# Patient Record
Sex: MALE | NOT HISPANIC OR LATINO | Employment: FULL TIME | URBAN - METROPOLITAN AREA
[De-identification: names, ages, dates, MRNs, and addresses within clinical notes are randomized per-mention and may not be internally consistent; named-entity substitution may affect disease eponyms.]

---

## 2019-06-25 ENCOUNTER — HOSPITAL ENCOUNTER (EMERGENCY)
Facility: CLINIC | Age: 44
Discharge: HOME | End: 2019-06-25
Attending: FAMILY MEDICINE | Admitting: FAMILY MEDICINE
Payer: COMMERCIAL

## 2019-06-25 VITALS
DIASTOLIC BLOOD PRESSURE: 89 MMHG | OXYGEN SATURATION: 98 % | SYSTOLIC BLOOD PRESSURE: 121 MMHG | TEMPERATURE: 98 F | RESPIRATION RATE: 16 BRPM | WEIGHT: 135 LBS | HEART RATE: 69 BPM

## 2019-06-25 DIAGNOSIS — F10.920 ALCOHOLIC INTOXICATION WITHOUT COMPLICATION (H): ICD-10-CM

## 2019-06-25 DIAGNOSIS — F10.129 ALCOHOL ABUSE WITH INTOXICATION (H): ICD-10-CM

## 2019-06-25 LAB
ALCOHOL BREATH TEST: 0.18 (ref 0–0.01)
ALCOHOL BREATH TEST: 0.23 (ref 0–0.01)
AMPHETAMINES UR QL SCN: NEGATIVE
BARBITURATES UR QL: NEGATIVE
BENZODIAZ UR QL: NEGATIVE
CANNABINOIDS UR QL SCN: NEGATIVE
COCAINE UR QL: NEGATIVE
ETHANOL UR QL SCN: POSITIVE
OPIATES UR QL SCN: NEGATIVE

## 2019-06-25 PROCEDURE — 82075 ASSAY OF BREATH ETHANOL: CPT | Performed by: FAMILY MEDICINE

## 2019-06-25 PROCEDURE — 80320 DRUG SCREEN QUANTALCOHOLS: CPT | Performed by: FAMILY MEDICINE

## 2019-06-25 PROCEDURE — 99285 EMERGENCY DEPT VISIT HI MDM: CPT | Performed by: FAMILY MEDICINE

## 2019-06-25 PROCEDURE — 99283 EMERGENCY DEPT VISIT LOW MDM: CPT | Mod: Z6 | Performed by: FAMILY MEDICINE

## 2019-06-25 PROCEDURE — 80307 DRUG TEST PRSMV CHEM ANLYZR: CPT | Performed by: FAMILY MEDICINE

## 2019-06-25 RX ORDER — HYDROXYZINE HYDROCHLORIDE 25 MG/1
25-50 TABLET, FILM COATED ORAL EVERY 6 HOURS PRN
Qty: 30 TABLET | Refills: 0 | Status: SHIPPED | OUTPATIENT
Start: 2019-06-25

## 2019-06-25 ASSESSMENT — ENCOUNTER SYMPTOMS
SHORTNESS OF BREATH: 0
SLEEP DISTURBANCE: 1
DYSPHORIC MOOD: 1
FEVER: 0
ABDOMINAL PAIN: 0

## 2019-06-25 NOTE — ED AVS SNAPSHOT
[facility], [place], Emergency Department  [address]  [facility] [address]  Phone:  [phone]  Fax:  [phone]                                    Wil Waller   MRN: [id number]    Department:  [facility], Emergency Department   Date of Visit:  6/25/2019           After Visit Summary Signature Page    I have received my discharge instructions, and my questions have been answered. I have discussed any challenges I see with this plan with the nurse or doctor.    ..........................................................................................................................................  Patient/Patient Representative Signature      ..........................................................................................................................................  Patient Representative Print Name and Relationship to Patient    ..................................................               ................................................  Date                                   Time    ..........................................................................................................................................  Reviewed by Signature/Title    ...................................................              ..............................................  Date                                               Time          22EPIC Rev 08/18

## 2019-06-25 NOTE — ED PROVIDER NOTES
[facility] EMERGENCY DEPARTMENT ([facility])    6/25/19       History     Chief Complaint   Patient presents with     Alcohol Intoxication     pt called EMS for well check, drinking for last 3 days, 2 hours ago--6 beers and 2 shots     The history is provided by the patient.     Wil Waller is a 44 year old otherwise healthy male who presents to the Emergency Department for evaluation of alcohol intoxication.  Patient reports that he is currently going through a divorce and he has been feeling more depressed and lost.  The patient for the last couple of days has been drinking too much alcohol.  He reports that he is drinking approximately 6 beers and 3 shots of vodka a day.  The patient called 911 today as he states that he was too drunk and did not want his girlfriend to see him in that state.  The patient currently lives with his girlfriend.  The patient reports that he was not a heavy drinker prior to this and states that he did not drink daily prior to this.  He denies any history of withdrawals.  The patient denies any other drug use.  The patient denies any suicidal ideation.  He reports that he has been feeling more stressed, anxious and depressed lately.  He has been having difficulty sleeping at night.  The patient is not currently getting any mental health treatment or therapy, he reports that he has never had problems with mental health in the past.  The patient is not wanting to get detox as he reports that he has already missed 2 days of work and if he misses any more days he is going to get fired.    I have reviewed the Medications, Allergies, Past Medical and Surgical History, and Social History in the Epic system.    History reviewed. No pertinent past medical history.    History reviewed. No pertinent surgical history.    History reviewed. No pertinent family history.    Social History     Tobacco Use     Smoking status: Former Smoker     Types: Cigarettes     Smokeless tobacco: Never  Used   Substance Use Topics     Alcohol use: Yes     Comment: pt has drank lots since last Friday, prior to that, it was only random       No current facility-administered medications for this encounter.      Current Outpatient Medications   Medication     hydrOXYzine (ATARAX) 25 MG tablet        Allergies   Allergen Reactions     Varenicline      Dry mouth.         Review of Systems   Constitutional: Negative for fever.   Respiratory: Negative for shortness of breath.    Cardiovascular: Negative for chest pain.   Gastrointestinal: Negative for abdominal pain.   Psychiatric/Behavioral: Positive for dysphoric mood and sleep disturbance. Negative for suicidal ideas.   All other systems reviewed and are negative.      Physical Exam   BP: (!) 135/98  Heart Rate: 89  Temp: 97.6  F (36.4  C)  Resp: 18  Weight: 61.2 kg (135 lb)  SpO2: 96 %      Physical Exam   Constitutional: No distress.   HENT:   Head: Atraumatic.   Mouth/Throat: Oropharynx is clear and moist.   Eyes: Pupils are equal, round, and reactive to light. No scleral icterus.   Cardiovascular: Normal heart sounds and intact distal pulses.   Pulmonary/Chest: Breath sounds normal. No respiratory distress.   Abdominal: Soft. Bowel sounds are normal. There is no tenderness.   Musculoskeletal: He exhibits no edema or tenderness.   Skin: Skin is warm. No rash noted. He is not diaphoretic.   Psychiatric: His behavior is normal. Thought content normal. His mood appears anxious. His speech is slurred. He exhibits a depressed mood.       ED Course   2:14 PM  The patient was seen and examined by Dedrick Mendez MD in Room ED10.        Procedures             Critical Care time:  none             Labs Ordered and Resulted from Time of ED Arrival Up to the Time of Departure from the ED   ALCOHOL BREATH TEST POCT - Abnormal; Notable for the following components:       Result Value    Alcohol Breath Test 0.229 (*)     All other components within normal limits   ALCOHOL BREATH  TEST POCT - Abnormal; Notable for the following components:    Alcohol Breath Test 0.18 (*)     All other components within normal limits   DRUG ABUSE SCREEN 6 CHEM DEP URINE ([facility])            Assessments & Plan (with Medical Decision Making)   Patient presenting due to acute alcohol intoxication.  He states he is not habitual drinker.  He is experiencing a lot of stress and anxiety surrounding a pending divorce.  He states he just needed a place to sober up for a while as he did not want his girlfriend to see him in this intoxicated state.  He declines any detox admission but states he would consider outpatient resources.  He does endorse depressive symptoms and anxiety but no suicidal ideation, no homicidal ideation, no symptoms of psychosis.  He does not desire any outpatient referrals but would like something to help him for sleep and anxiety.  States he will consider AA.  He was allowed to sober in the emergency department for some time.  He is now much more clinically sober.  He has a friend who is coming to give him a ride.  Discussed potential outpatient resources and he states he would consider but no longer wishes to be here and does not meet any grounds for an involuntary hold.  We discussed the indications for emergency department return and follow-up.  Stable for discharge.      I have reviewed the nursing notes.    I have reviewed the findings, diagnosis, plan and need for follow up with the patient.       Medication List      Started    hydrOXYzine 25 MG tablet  Commonly known as:  ATARAX  25-50 mg, Oral, EVERY 6 HOURS PRN            Final diagnoses:   Alcoholic intoxication without complication (H)     IRobinson, am serving as a trained medical scribe to document services personally performed by Dedrick Mendez MD, based on the provider's statements to me.   Dedrick TURCIOS MD, was physically present and have reviewed and verified the accuracy of this note documented by Robinson  Rufino.    6/25/2019   [facility], [place], EMERGENCY DEPARTMENT     Dedrick Mendez MD  06/25/19 7006

## 2019-06-25 NOTE — ED NOTES
Bed: ED10  Expected date:   Expected time:   Means of arrival:   Comments:  [id number]  44M  ETOH  ETA 13:55

## 2019-06-25 NOTE — DISCHARGE INSTRUCTIONS
Thank you for choosing [facility].     Please closely monitor for further symptoms. Return to the Emergency Department if you develop any new or worsening signs or symptoms.    If you received any opiate pain medications or sedatives during your visit, please do not drive for at least 8 hours.     Labs, cultures or final xray interpretations may still need to be reviewed.  We will call you if your plan of care needs to be changed.    Please follow up with your primary care physician or clinic.  To check the status of detox bed availability at [place] call:  [phone]  To check the status of detox availability at [facility] call:  [phone]  To check the status of detox bed availability at [address] [place] [address] call:  [phone]  If you desire chemical dependency assessment or counseling, follow up with [facility]: [phone]